# Patient Record
(demographics unavailable — no encounter records)

---

## 2024-11-11 NOTE — HISTORY OF PRESENT ILLNESS
[FreeTextEntry1] : 26 y/o  at 10w1d presents for confirmation of pregnancy. Patient is a NATALYA from another practice, was told her GARY was 25. Denies cramping, bleeding, LOF. Reports she has an appt with ATU for 24, was going to be seen for some abnormality on previous ultrasounds, patient unsure what the abnormality was.   OB: nulliparous GYN: denies PMH: denies PSH: denies FH: denies Meds: PNV Allergies: NKDA Social: Denies toxic habits

## 2024-11-11 NOTE — PLAN
[FreeTextEntry1] : 26 y/o  at 10w1d for confirmation of pregnancy, transfer of care.  Plan: - Welcome letter provided - Information for patient portal given - 1st OB labs reviewed - Screening options, including NIPS and NT reviewed with patient; NIPS done today, to follow up with ATU for NT - Cord blood collection discussed - Early pregnancy precautions given - Continue PNV - RTO in 4 weeks

## 2025-07-11 NOTE — HISTORY OF PRESENT ILLNESS
[Postpartum Follow Up] : postpartum follow up [Delivery Date: ___] : on [unfilled] [Male] : Delivery History: baby boy [Vaginal Delivery] : vaginal delivery [Delivery Date Was ___] : delivery date was [unfilled] [Pertussis Vaccine] : Pertussis vaccine administered [Boy] : baby is a boy [Infant's Name ___] : [unfilled] [___ Lbs] : [unfilled] lbs [___ Oz] : [unfilled] oz [Circumcised] : circumcised [Living at Home] : is currently living at home [Breastfeeding] : currently nursing [Condoms] : condoms [Withdrawal] : withdrawal method [Vacuum Assist] : delivered by vaginal delivery using vaccum assist [Complications:___] : no complications [Rhogam] : Rhogam was not administered [Rubella Vaccine] : Rubella vaccine was not administered [BTL] : no tubal ligation [BF with Difficulty] : nursing without difficulty [Resumed Menses] : has not resumed her menses [Resumed [place]] : has not resumed intercourse [Intended Contraception] : the patient does not intended to use contraception postpartum [Awake] : awake [Alert] : alert [Acute Distress] : no acute distress [Mass] : no breast mass [Nipple Discharge] : no nipple discharge [Axillary LAD] : no axillary lymphadenopathy [Soft] : soft [Tender] : non tender [Distended] : not distended [Oriented x3] : oriented to person, place, and time [Depressed Mood] : not depressed [Flat Affect] : affect not flat [Labia Majora Erythema] : no erythema of the labia majora [Labia Minora Erythema] : no erythema of the labia minora [Normal] : external genitalia [Motion Tenderness] : there was no cervical motion tenderness [Tenderness] : nontender [Adnexa Tenderness] : were not tender [Doing Well] : is doing well [No Sign of Infection] : is showing no signs of infection [Excellent Pain Control] : has excellent pain control [None] : None [FreeTextEntry8] : This 27 yo P 1001 presents for PPV after vaginal delivery; feels well, voiding and stooling without issue; will use condoms for now. [de-identified] : Nl PPV [de-identified] : RTO prn or for annual in 4 months